# Patient Record
(demographics unavailable — no encounter records)

---

## 2025-03-06 NOTE — HISTORY OF PRESENT ILLNESS
[FreeTextEntry1] : 24 yo reg menses not to heavy and no painful. Happy using condoms now , she had lo libido and lo emotion on loestrin 1.5/30 she is much happier now, off of contraceptives  she hasnt checked her new partner for std SHe has had gardisil shots [Currently Active] : currently active [Men] : men [Vaginal] : vaginal [Yes] : Yes

## 2025-03-06 NOTE — HISTORY OF PRESENT ILLNESS
[FreeTextEntry1] : 22 yo reg menses not to heavy and no painful. Happy using condoms now , she had lo libido and lo emotion on loestrin 1.5/30 she is much happier now, off of contraceptives  she hasnt checked her new partner for std SHe has had gardisil shots [Currently Active] : currently active [Men] : men [Vaginal] : vaginal [Yes] : Yes

## 2025-03-06 NOTE — PHYSICAL EXAM
[Regular Rate Rhythm] : regular rate rhythm [No Murmurs] : no murmurs [Clear to Auscultation B/L] : clear to auscultation bilaterally [Chaperone Declined] : Patient declined chaperone [Appropriately responsive] : appropriately responsive [Alert] : alert [No Acute Distress] : no acute distress [No Lymphadenopathy] : no lymphadenopathy [Soft] : soft [Non-tender] : non-tender [Non-distended] : non-distended [No HSM] : No HSM [No Lesions] : no lesions [No Mass] : no mass [Oriented x3] : oriented x3 [Examination Of The Breasts] : a normal appearance [No Masses] : no breast masses were palpable [Labia Majora] : normal [Labia Minora] : normal [Normal] : normal [Uterine Adnexae] : normal

## 2025-04-28 NOTE — HISTORY OF PRESENT ILLNESS
[FreeTextEntry1] : 23-year-old female G0 presenting office for colposcopy secondary to an abnormal cervical Pap smear 3/12/2025 resulting atypical squamous cells cannot rule out high-grade in the background of LGSIL.  She has never had an abnormal cervical Pap smear in the past and is understandably nervous.  Her in office urine pregnancy test is negative.  There is no autoimmune pathology, but is currently being worked up for elevated liver enzymes which could possibly be autoimmune related.  She has no signs or symptoms of cervical cancer such as abnormal vaginal discharge, abnormal vaginal bleeding, or pain with intercourse.

## 2025-04-28 NOTE — COUNSELING
[Nutrition/ Exercise/ Weight Management] : nutrition, exercise, weight management [Vitamins/Supplements] : vitamins/supplements [Confidentiality] : confidentiality [Medication Management] : medication management [Pre/Post Op Instructions] : pre/post op instructions

## 2025-04-28 NOTE — PLAN
[FreeTextEntry1] : Pathogenesis of HPV and significance of her recent Pap smear discussed at length including recommendation by [facility] for colposcopic examination, ECC and possible cervical biopsy.  Colposcopic examination, ECC and cervical biopsies performed as described.  She was given opportunity to ask questions both prior to and postprocedure.  She was given call, follow, ER precautions regarding signs and symptoms of abnormal bleeding and infection.  Patient was counseled on maintaining a healthy immune system such as diet and exercise.  She was also counseled on vitamin supplementation including AHCC.